# Patient Record
Sex: FEMALE | URBAN - METROPOLITAN AREA
[De-identification: names, ages, dates, MRNs, and addresses within clinical notes are randomized per-mention and may not be internally consistent; named-entity substitution may affect disease eponyms.]

---

## 2017-01-31 NOTE — PATIENT DISCUSSION
(H11.042) Peripheral pterygium, stationary, left eye - Assesment : Examination revealed Stationary Peripheral Pterygium. Irritated now. Follows up [facility] for [employer]. - Plan : Start FML OS tid for 1 week. Condition explained and handout given. Recommended ATs everyday, 2-3+ times and to wear sunglasses while in the sun. Call if symptoms worsen or new symptoms or vision changes occur. Continue with regular eye exams as scheduled. RTC here prn.

## 2017-03-07 NOTE — PATIENT DISCUSSION
(H11.042) Peripheral pterygium, stationary, left eye - Assesment : Examination revealed Stationary Peripheral Pterygium. Steroid helped irritation, now appears dry and not irritated. Follows up [facility] for [employer]. - Plan : Recommended ATs everyday, 2-3+ times  such as Refresh or Systane. Refresh sample given today. Recommended to wear sunglasses while in the sun. Answered Pt's questions about Sx to remove and recommended Pt to have regular Dr up [facility] evaluate when returns as Pt states has changed in size. Call if symptoms worsen or new symptoms or vision changes occur. Continue with regular eye exams as scheduled. RTC here prn.

## 2017-03-07 NOTE — PATIENT DISCUSSION
(K09.305) Keratoconjunct sicca, not specified as Sjogren's, bilateral - Assesment : Examination revealed Dry Eye Syndrome - Plan : Monitor for changes. ATs recommended daily 2-3+ times OU. Refresh sample given today.

## 2019-01-28 NOTE — PATIENT DISCUSSION
(E73.836) Vitreous degeneration, left eye - Assesment : Examination revealed PVD OS. No changes reported. No holes or tears noted. - Plan : Monitor for changes. Advised patient to call our office with decreased vision or an increase in flashes and/or floaters.

## 2019-01-28 NOTE — PATIENT DISCUSSION
(H11.042) Peripheral pterygium, stationary, left eye - Assesment : Examination revealed Pterygium nasally OS. - Plan : Continue daily use of ATs and wearing sunglasses. Call if irritation, redness, changes, or concerns.

## 2019-01-28 NOTE — PATIENT DISCUSSION
(H25.12) Age-related nuclear cataract, left eye - Assesment : Examination revealed cataract OS. - Plan : Monitor for changes. Updated GLRx given today. Advised patient to call our office with decreased vision or increased symptoms. RTC in 1 year for Exam, sooner if problems or changes.

## 2019-04-02 ENCOUNTER — IMPORTED ENCOUNTER (OUTPATIENT)
Dept: URBAN - METROPOLITAN AREA CLINIC 9 | Facility: CLINIC | Age: 24
End: 2019-04-02

## 2019-10-21 ENCOUNTER — IMPORTED ENCOUNTER (OUTPATIENT)
Dept: URBAN - METROPOLITAN AREA CLINIC 9 | Facility: CLINIC | Age: 24
End: 2019-10-21

## 2019-10-31 ENCOUNTER — IMPORTED ENCOUNTER (OUTPATIENT)
Dept: URBAN - METROPOLITAN AREA CLINIC 9 | Facility: CLINIC | Age: 24
End: 2019-10-31

## 2019-11-01 ENCOUNTER — IMPORTED ENCOUNTER (OUTPATIENT)
Dept: URBAN - METROPOLITAN AREA CLINIC 9 | Facility: CLINIC | Age: 24
End: 2019-11-01

## 2021-10-16 ASSESSMENT — VISUAL ACUITY
OS_SC: 20/15 SN
OD_SC: 20/20 SN
OD_CC: 20/25 SN
OS_CC: 20/20 SN
OD_SC: 20/400 SN
OD_SC: 20/400 SN
OS_CC: 20/20 SN
OD_CC: 20/20 SN
OS_SC: 20/400 SN
OD_CC: 20/20 SN
OD_CC: 20/25 SN
OS_SC: 20/400 SN
OS_CC: 20/20 SN

## 2021-10-16 ASSESSMENT — KERATOMETRY
OD_AXISANGLE2_DEGREES: 178
OS_K2POWER_DIOPTERS: 44.25
OD_K2POWER_DIOPTERS: 44
OS_K2POWER_DIOPTERS: 44.75
OD_AXISANGLE2_DEGREES: 6
OD_AXISANGLE_DEGREES: 88
OS_K1POWER_DIOPTERS: 44
OS_AXISANGLE_DEGREES: 8
OS_AXISANGLE_DEGREES: 33
OS_AXISANGLE2_DEGREES: 123
OD_K2POWER_DIOPTERS: 44.25
OS_AXISANGLE2_DEGREES: 98
OD_K1POWER_DIOPTERS: 43.5
OS_K1POWER_DIOPTERS: 44
OD_K1POWER_DIOPTERS: 43.5
OD_AXISANGLE_DEGREES: 96

## 2021-10-16 ASSESSMENT — TONOMETRY
OS_IOP_MMHG: 15
OD_IOP_MMHG: 14

## 2021-10-16 ASSESSMENT — PACHYMETRY
OS_CT_UM: 575.0
OD_CT_UM: 572.0